# Patient Record
Sex: FEMALE | Race: WHITE | NOT HISPANIC OR LATINO | Employment: OTHER | URBAN - METROPOLITAN AREA
[De-identification: names, ages, dates, MRNs, and addresses within clinical notes are randomized per-mention and may not be internally consistent; named-entity substitution may affect disease eponyms.]

---

## 2017-10-06 ENCOUNTER — TRANSCRIBE ORDERS (OUTPATIENT)
Dept: ADMINISTRATIVE | Facility: HOSPITAL | Age: 62
End: 2017-10-06

## 2017-10-06 DIAGNOSIS — Z12.39 SCREENING BREAST EXAMINATION: Primary | ICD-10-CM

## 2017-11-02 ENCOUNTER — ALLSCRIPTS OFFICE VISIT (OUTPATIENT)
Dept: OTHER | Facility: OTHER | Age: 62
End: 2017-11-02

## 2017-11-02 ENCOUNTER — LAB REQUISITION (OUTPATIENT)
Dept: LAB | Facility: HOSPITAL | Age: 62
End: 2017-11-02
Payer: COMMERCIAL

## 2017-11-02 DIAGNOSIS — Z12.31 ENCOUNTER FOR SCREENING MAMMOGRAM FOR MALIGNANT NEOPLASM OF BREAST: ICD-10-CM

## 2017-11-02 DIAGNOSIS — Z11.51 ENCOUNTER FOR SCREENING FOR HUMAN PAPILLOMAVIRUS (HPV): ICD-10-CM

## 2017-11-02 DIAGNOSIS — Z12.4 ENCOUNTER FOR SCREENING FOR MALIGNANT NEOPLASM OF CERVIX: ICD-10-CM

## 2017-11-02 PROCEDURE — G0145 SCR C/V CYTO,THINLAYER,RESCR: HCPCS | Performed by: OBSTETRICS & GYNECOLOGY

## 2017-11-02 PROCEDURE — 87624 HPV HI-RISK TYP POOLED RSLT: CPT | Performed by: OBSTETRICS & GYNECOLOGY

## 2017-11-03 NOTE — PROGRESS NOTES
Assessment  1  Encounter for annual routine gynecological examination (V72 31) (Z01 419)   2  Encounter for screening mammogram for malignant neoplasm of breast (V76 12)   (Z12 31)    Plan   Cervical cancer screening, Screening for HPV (human papillomavirus)    · (1) THIN PREP PAP WITH IMAGING; Status: In Progress - Specimen/Data Collected;    Done: [id number]   Perform:[facility] In Office Collection; Order Comments:ROUTINE PAP WITH HPV; OYC:[id number]; Ordered; For:Cervical cancer screening, Screening for HPV (human papillomavirus); Ordered By:Neva Birch;  Maturation index required? : No  : PARTIAL HYSTERECTOMY  HPV? : Regardless of Interpretation    * MAMMO SCREENING BILATERAL W CAD; Status:Hold For - Scheduling; Requested CJJ:[id number];   Perform:[facility]; IEV:[id number]; Ordered;    For:Encounter for screening mammogram for malignant neoplasm of breast; Ordered By:Neva Birch;      Discussion/Summary  HPV and Pap Co-testing Done Today Breast cancer screening: the risks and benefits of breast cancer screening were discussed, self breast exam technique was taught, monthly self breast exam was advised, mammogram is current and mammogram has been ordered  Colorectal cancer screening: the risks and benefits of colorectal cancer screening were discussed  Osteoporosis screening: the risks and benefits of osteoporosis screening were discussed  Advice and education were given regarding aerobic exercise and weight bearing exercise  Vaginal dryness-she will call if she needs more estrace, samples given  reviewed and ordered, she has it scheduled  hpv done today  order DEXA next year  The patient has the current Goals: See discussion  The patent has the current Barriers: None  Patient is able to Self-Care  Possible side effects of new medications were reviewed with the patient/guardian today  The treatment plan was reviewed with the patient/guardian   The patient/guardian understands and agrees with the treatment plan      Chief Complaint  1  Visit For: Preventive General Multisystem Exam    History of Present Illness  HPI: Patient here for yearly, she has no gyn complaints  She is using estrace, she has some at home, and does not think she needs a new RX  She is due for mammogram, she has scattered fibroglandular densities  She is due for a Pap  GYN HM, Adult Female [facility]: The patient is being seen for a health maintenance evaluation  The last health maintenance visit was 1 year(s) ago  General Health: The patient's health since the last visit is described as good  Lifestyle:  She does not have any weight concerns  -- She exercises regularly  -- She does not use tobacco    Reproductive health: the patient is postmenopausal    Screening:      Review of Systems    Constitutional: No fever, no chills, feels well, no tiredness, no recent weight gain or loss  ENT: no ear ache, no loss of hearing, no nosebleeds or nasal discharge, no sore throat or hoarseness  Cardiovascular: no complaints of slow or fast heart rate, no chest pain, no palpitations, no leg claudication or lower extremity edema  Respiratory: no complaints of shortness of breath, no wheezing, no dyspnea on exertion, no orthopnea or PND  Breasts: no complaints of breast pain, breast lump or nipple discharge  Gastrointestinal: no complaints of abdominal pain, no constipation, no nausea or diarrhea, no vomiting, no bloody stools  Genitourinary: no complaints of dysuria, no incontinence, no pelvic pain, no dysmenorrhea, no vaginal discharge or abnormal vaginal bleeding  Musculoskeletal: no complaints of arthralgia, no myalgia, no joint swelling or stiffness, no limb pain or swelling  Integumentary: no complaints of skin rash or lesion, no itching or dry skin, no skin wounds  Neurological: no complaints of headache, no confusion, no numbness or tingling, no dizziness or fainting  Active Problems  1   Atrophy of vagina (627  3) (N95 2)   2  Breast self examination education, encounter for (V65 49) (Z71 89)   3  Encounter for annual routine gynecological examination (V72 31) (Z01 419)   4  Encounter for screening mammogram for malignant neoplasm of breast (V76 12)   (Z12 31)   5  Hematochezia (578 1) (K92 1)    Past Medical History   · Breast self examination education, encounter for (V65 49) (Z71 89)   · History of Never Pregnant   · History of Reported A Previous Heart Murmur    The active problems and past medical history were reviewed and updated today  Surgical History   · History of Complete Colonoscopy   · History of Hysteroscopy With Endometrial Ablation   · History of Kidney Surgery    The surgical history was reviewed and updated today  Family History  Mother    · Family history of Diabetes Mellitus (V18 0)   · Family history of Skin Cancer (V16 8)  Father    · Family history of cardiac disorder (V17 49) (Z82 49)  Maternal Grandfather    · Family history of Carcinoma Of The Pancreas    The family history was reviewed and updated today  Social History   · Being A Social Drinker   · Caffeine Use   · Denied: History of Drug Use   · Former smoker (V15 82) (U57 916)   · Marital History - Currently    · Yazidi Affiliation None   · Uses Safety Equipment - Seatbelts  The social history was reviewed and updated today  Current Meds   1  Citracal + D TABS; Therapy: (Recorded:02Nov2017) to Recorded   2  Estrace 0 1 MG/GM Vaginal Cream; 1/4 avila per vagina @ HS twice a week; Therapy: [id number] to (Last Rx:04Oct2016)  Requested for: 04Oct2016 Ordered   3  Ginkgo TABS; Therapy: (Recorded:14Apr2015) to Recorded   4  Glucosamine Chondr 500 Complex Oral Capsule; Therapy: [phone]) to Recorded   5  Multi-Vitamin Daily Oral Tablet; Therapy: (Recorded:14Mar2014) to Recorded    Allergies  1   No Known Drug Allergies    Vitals   Recorded: [id number] 51:87JD   Systolic 961, RUE, Sitting Diastolic 78, RUE, Sitting   Height 5 ft 5 5 in   Weight 151 lb 6 oz   BMI Calculated 24 81   BSA Calculated 1 77   LMP PARTIAL HYSTERECTOMY     Physical Exam    Constitutional   General appearance: No acute distress, well appearing and well nourished  Neck   Thyroid: Normal, no thyromegaly  Pulmonary   Auscultation of lungs: Clear to auscultation  Cardiovascular   Auscultation of heart: Normal rate and rhythm, normal S1 and S2, no murmurs  Genitourinary   External genitalia: Normal and no lesions appreciated  Vagina: Normal, no lesions or dryness appreciated  Urethra: Normal     Urethral meatus: Normal     Bladder: Normal, soft, non-tender and no prolapse or masses appreciated  Cervix: Normal, no palpable masses  Uterus: Normal, non-tender, not enlarged, and no palpable masses  Adnexa/parametria: Normal, non-tender and no fullness or masses appreciated  Anus, perineum, and rectum: Normal sphincter tone, no masses, and no prolapse  Chest   Breasts: Normal and no dimpling or skin changes noted  Abdomen   Abdomen: Normal, non-tender, and no organomegaly noted  Liver and spleen: No hepatomegaly or splenomegaly  Examination for hernias: No hernias appreciated  Psychiatric   Orientation to person, place, and time: Normal     Mood and affect: Normal        Signatures   Electronically signed by :  Jessica Gonzalez DO; Nov 2 2017  9:38PM EST                       (Author)

## 2017-11-07 LAB
HPV RRNA GENITAL QL NAA+PROBE: NORMAL
LAB AP GYN PRIMARY INTERPRETATION: NORMAL
Lab: NORMAL

## 2017-11-09 ENCOUNTER — HOSPITAL ENCOUNTER (OUTPATIENT)
Dept: MAMMOGRAPHY | Facility: MEDICAL CENTER | Age: 62
Discharge: HOME | End: 2017-11-09
Payer: COMMERCIAL

## 2017-11-09 DIAGNOSIS — Z12.31 ENCOUNTER FOR SCREENING MAMMOGRAM FOR MALIGNANT NEOPLASM OF BREAST: ICD-10-CM

## 2017-11-09 PROCEDURE — G0202 SCR MAMMO BI INCL CAD: HCPCS

## 2018-01-13 NOTE — MISCELLANEOUS
Message   Date: 03 Nov 2017 9:27 AM EST, Recorded By: José Miguel Glasgow For: Zeina Escort: Artie Mantilla, Self   Phone: [phone] (Home), [phone] (Work)   Reason: Medical Complaint   pt had received a flu vaccine and was awake in the middle of night with flu-like sx    she was cold and freezing and feels a little bit of nausea    Advised pt to take Tylenol/ Motrin and to hydrate as much as possible  and to eat small meals and have saltines and ginger ale           Active Problems    1  Atrophy of vagina (627 3) (N95 2)   2  Breast self examination education, encounter for (V65 49) (Z71 89)   3  Cervical cancer screening (V76 2) (Z12 4)   4  Encounter for annual routine gynecological examination (V72 31) (Z01 419)   5  Encounter for screening mammogram for malignant neoplasm of breast (V76 12)   (Z12 31)   6  Hematochezia (578 1) (K92 1)   7  Need for influenza vaccination (V04 81) (Z23)   8  Screening for HPV (human papillomavirus) (V73 81) (Z11 51)    Current Meds   1  Citracal + D TABS; Therapy: (Recorded:02Nov2017) to Recorded   2  Estrace 0 1 MG/GM Vaginal Cream; 1/4 avila per vagina @ HS twice a week; Therapy: [id number] to (Last Rx:04Oct2016)  Requested for: 04Oct2016 Ordered   3  Ginkgo TABS; Therapy: (Recorded:14Apr2015) to Recorded   4  Glucosamine Chondr 500 Complex Oral Capsule; Therapy: ([id number]) to Recorded   5  Multi-Vitamin Daily Oral Tablet; Therapy: (Recorded:14Mar2014) to Recorded    Allergies    1  No Known Drug Allergies    Plan  Cervical cancer screening, Screening for HPV (human papillomavirus)    · (1) THIN PREP PAP WITH IMAGING; Status: In Progress - Specimen/Data Collected;    Done: [id number]  Maturation index required? : No  : PARTIAL HYSTERECTOMY  HPV? : Regardless of Interpretation    Signatures   Electronically signed by : Marilu Noriega, ; Nov  3 2017  9:29AM EST                       (Author)

## 2018-01-14 VITALS
SYSTOLIC BLOOD PRESSURE: 126 MMHG | HEIGHT: 66 IN | WEIGHT: 151.38 LBS | BODY MASS INDEX: 24.33 KG/M2 | DIASTOLIC BLOOD PRESSURE: 78 MMHG

## 2018-10-04 DIAGNOSIS — Z12.31 ENCOUNTER FOR SCREENING MAMMOGRAM FOR BREAST CANCER: Primary | ICD-10-CM

## 2018-10-17 ENCOUNTER — TRANSCRIBE ORDERS (OUTPATIENT)
Dept: ADMINISTRATIVE | Facility: HOSPITAL | Age: 63
End: 2018-10-17

## 2018-10-17 DIAGNOSIS — Z13.820 SCREENING FOR OSTEOPOROSIS: Primary | ICD-10-CM

## 2018-11-12 ENCOUNTER — HOSPITAL ENCOUNTER (OUTPATIENT)
Dept: MAMMOGRAPHY | Facility: HOSPITAL | Age: 63
Discharge: HOME | End: 2018-11-12
Attending: OBSTETRICS & GYNECOLOGY
Payer: MEDICARE

## 2018-11-12 VITALS — WEIGHT: 150 LBS | HEIGHT: 66 IN | BODY MASS INDEX: 24.11 KG/M2

## 2018-11-12 DIAGNOSIS — Z12.31 ENCOUNTER FOR SCREENING MAMMOGRAM FOR BREAST CANCER: ICD-10-CM

## 2018-11-12 PROCEDURE — 77067 SCR MAMMO BI INCL CAD: CPT

## 2018-11-27 ENCOUNTER — HOSPITAL ENCOUNTER (OUTPATIENT)
Dept: BONE DENSITY | Facility: MEDICAL CENTER | Age: 63
Discharge: HOME | End: 2018-11-27
Payer: MEDICARE

## 2018-11-27 DIAGNOSIS — Z13.820 SCREENING FOR OSTEOPOROSIS: ICD-10-CM

## 2018-11-27 PROCEDURE — 77080 DXA BONE DENSITY AXIAL: CPT

## 2019-01-04 ENCOUNTER — ANNUAL EXAM (OUTPATIENT)
Dept: OBGYN CLINIC | Facility: CLINIC | Age: 64
End: 2019-01-04
Payer: MEDICARE

## 2019-01-04 VITALS
HEIGHT: 66 IN | BODY MASS INDEX: 24.4 KG/M2 | DIASTOLIC BLOOD PRESSURE: 80 MMHG | WEIGHT: 151.8 LBS | SYSTOLIC BLOOD PRESSURE: 120 MMHG

## 2019-01-04 DIAGNOSIS — Z01.419 ENCOUNTER FOR ANNUAL ROUTINE GYNECOLOGICAL EXAMINATION: ICD-10-CM

## 2019-01-04 DIAGNOSIS — Z12.31 ENCOUNTER FOR SCREENING MAMMOGRAM FOR MALIGNANT NEOPLASM OF BREAST: Primary | ICD-10-CM

## 2019-01-04 PROCEDURE — G0101 CA SCREEN;PELVIC/BREAST EXAM: HCPCS | Performed by: OBSTETRICS & GYNECOLOGY

## 2019-01-04 RX ORDER — ESTRADIOL 0.1 MG/G
CREAM VAGINAL
COMMUNITY
Start: 2016-10-04 | End: 2020-07-13 | Stop reason: SDUPTHER

## 2019-01-04 RX ORDER — ASCORBIC ACID 1000 MG
120 TABLET ORAL
COMMUNITY

## 2019-01-04 NOTE — PROGRESS NOTES
Assessment/Plan:    Pap and HPV are up to date, reviewed guidelines    Mammogram reviewed with her and prescription given for November  Discussed self-breast exams  We discussed Bartholin's cysts  It sounds like that is what she is describing  I told her that if it recurs she can try warm compresses but if it becomes large and or painful, she can come in and we can drain it and possibly insert a Word catheter  She was concerned that this may be hygiene issue but I reassured her  I also stated that more regular use of the estrogen cream May decreased recurrences as her tissue was somewhat atrophic  Vaginal atrophy-Estrace cream not renewed because she still has some at home, she will start using it once a week  Colon cancer screening discussed    Discussed regular exercise  No problem-specific Assessment & Plan notes found for this encounter  Diagnoses and all orders for this visit:    Encounter for screening mammogram for malignant neoplasm of breast  -     Mammo screening bilateral w cad; Future    Encounter for annual routine gynecological examination          Subjective:      Patient ID: Cruz River is a 70 y o  female  Patient here for yearly  She is doing well  She is using vaginal estrogen cream but is not consistent with its use  Since her 25s, she has had issues with a left Bartholin's cyst   She has not mentioned it because it often comes and goes over several days and always seems to spontaneously resolve  It last happened about 3 weeks ago  It drains on its own and then she has a little sore  She had a normal Pap in 2017  In November she had a normal DEXA and also a normal 3D mammogram that showed fatty replaced breast tissue          The following portions of the patient's history were reviewed and updated as appropriate: allergies, current medications, past family history, past medical history, past social history, past surgical history and problem list     Review of Systems   All other systems reviewed and are negative  Objective:      /80 (BP Location: Left arm, Patient Position: Sitting, Cuff Size: Large)   Ht 5' 6" (1 676 m)   Wt 68 9 kg (151 lb 12 8 oz)   BMI 24 50 kg/m²          Physical Exam   Constitutional: She appears well-developed  Neck: No tracheal deviation present  No thyromegaly present  Cardiovascular: Normal rate and regular rhythm  Pulmonary/Chest: Effort normal and breath sounds normal  Right breast exhibits no inverted nipple, no mass, no nipple discharge, no skin change and no tenderness  Left breast exhibits no inverted nipple, no mass, no nipple discharge, no skin change and no tenderness  Breasts are symmetrical    Examined seated and supine   Abdominal: Soft  She exhibits no distension and no mass  There is no tenderness  Genitourinary: Rectum normal, vagina normal and uterus normal  No labial fusion  There is no rash, tenderness, lesion or injury on the right labia  There is no rash, tenderness, lesion or injury on the left labia  Cervix exhibits no motion tenderness, no discharge and no friability  Right adnexum displays no mass, no tenderness and no fullness  Left adnexum displays no mass, no tenderness and no fullness  Genitourinary Comments: No sign of Bartholin cyst on either side and no sign of labial cysts  Vitals reviewed

## 2019-10-21 ENCOUNTER — OFFICE VISIT (OUTPATIENT)
Dept: OBGYN CLINIC | Facility: CLINIC | Age: 64
End: 2019-10-21
Payer: MEDICARE

## 2019-10-21 VITALS
SYSTOLIC BLOOD PRESSURE: 120 MMHG | HEIGHT: 66 IN | BODY MASS INDEX: 23.95 KG/M2 | DIASTOLIC BLOOD PRESSURE: 70 MMHG | WEIGHT: 149 LBS

## 2019-10-21 DIAGNOSIS — N90.7 VULVAR CYST: Primary | ICD-10-CM

## 2019-10-21 PROCEDURE — 99213 OFFICE O/P EST LOW 20 MIN: CPT | Performed by: OBSTETRICS & GYNECOLOGY

## 2019-10-21 RX ORDER — ESTRADIOL 0.1 MG/G
CREAM VAGINAL
COMMUNITY
Start: 2016-10-04 | End: 2020-07-21 | Stop reason: SDUPTHER

## 2019-10-21 NOTE — PROGRESS NOTES
Assessment/Plan:   Resolved vulvar lesions  I instructed dull to simply apply Neosporin cream to these areas for 1 or 2 days more to completely resolve these areas  I also informed her that in the future, when these occur, she should be seen since at this point in time they are 98% resolved and it is very difficult to establish what may have been the cause  There are no diagnoses linked to this encounter  Subjective:     Patient ID: Marck Mcgarry is a 75 y o  female  [facility] is here today with concerns of an area of the left vulva that initially started a week ago with pain and swelling  Over the course of the week, the patient applied warm compresses and Neosporin to the point where she presents today to ensure that everything is okay  She states that this is been recurring "all her life"  She apparently has had Bartholin's cysts in the past and wanted to know what was going on  Review of Systems    Only significant for discomfort in left vulvar region  Objective:     Physical Exam    The vulvar tissue overall is within normal limits with no obvious lesions or palpable masses  The left vulva around the 4 o'clock region, has 2 minute areas of very superficial skin Man S and erythema measuring no more than 1 mm each  There is no surrounding erythema or underlying swelling  Palpation of the left Bartholin's gland fails to reveal any significant findings

## 2019-11-26 ENCOUNTER — TELEPHONE (OUTPATIENT)
Dept: OBGYN CLINIC | Facility: CLINIC | Age: 64
End: 2019-11-26

## 2019-12-18 ENCOUNTER — HOSPITAL ENCOUNTER (OUTPATIENT)
Dept: MAMMOGRAPHY | Facility: HOSPITAL | Age: 64
Discharge: HOME | End: 2019-12-18
Attending: OBSTETRICS & GYNECOLOGY
Payer: MEDICARE

## 2019-12-18 VITALS — BODY MASS INDEX: 23.95 KG/M2 | HEIGHT: 66 IN | WEIGHT: 149 LBS

## 2019-12-18 DIAGNOSIS — Z12.31 ENCOUNTER FOR SCREENING MAMMOGRAM FOR MALIGNANT NEOPLASM OF BREAST: ICD-10-CM

## 2019-12-18 PROCEDURE — 77067 SCR MAMMO BI INCL CAD: CPT

## 2020-07-13 DIAGNOSIS — N95.2 VAGINAL ATROPHY: Primary | ICD-10-CM

## 2020-07-13 RX ORDER — ESTRADIOL 0.1 MG/G
1 CREAM VAGINAL 2 TIMES WEEKLY
Qty: 42.5 G | Refills: 0 | Status: SHIPPED | OUTPATIENT
Start: 2020-07-13 | End: 2021-01-05 | Stop reason: SDUPTHER

## 2020-07-21 ENCOUNTER — ANNUAL EXAM (OUTPATIENT)
Dept: OBGYN CLINIC | Facility: CLINIC | Age: 65
End: 2020-07-21
Payer: MEDICARE

## 2020-07-21 VITALS
WEIGHT: 146.6 LBS | SYSTOLIC BLOOD PRESSURE: 122 MMHG | BODY MASS INDEX: 23.66 KG/M2 | TEMPERATURE: 97 F | DIASTOLIC BLOOD PRESSURE: 74 MMHG

## 2020-07-21 DIAGNOSIS — Z12.31 ENCOUNTER FOR SCREENING MAMMOGRAM FOR BREAST CANCER: ICD-10-CM

## 2020-07-21 DIAGNOSIS — Z01.419 ENCOUNTER FOR ANNUAL ROUTINE GYNECOLOGICAL EXAMINATION: Primary | ICD-10-CM

## 2020-07-21 DIAGNOSIS — N95.2 VAGINAL ATROPHY: ICD-10-CM

## 2020-07-21 PROCEDURE — G0101 CA SCREEN;PELVIC/BREAST EXAM: HCPCS | Performed by: OBSTETRICS & GYNECOLOGY

## 2020-07-21 RX ORDER — ESTRADIOL 0.1 MG/G
CREAM VAGINAL
Qty: 42.5 G | Refills: 1 | Status: SHIPPED | OUTPATIENT
Start: 2020-07-21 | End: 2021-07-29 | Stop reason: SDUPTHER

## 2020-07-21 NOTE — PROGRESS NOTES
Assessment/Plan:    She does not require a Pap was    mammogram reviewed with her including breast density  RX given for December   Discussed self breast exams    colon cancer screening - colonoscopy up to date    discussed preventive care, regular exercise and a healthy diet    Vaginal atrophy-paper prescription given for estradiol plain  She will check her formulary to see if there is better coverage so her another she is vaginal estrogen product  No problem-specific Assessment & Plan notes found for this encounter  Diagnoses and all orders for this visit:    Encounter for annual routine gynecological examination    Encounter for screening mammogram for breast cancer  -     Mammo screening bilateral w 3d & cad; Future    Vaginal atrophy  -     estradiol (ESTRACE VAGINAL) 0 1 mg/g vaginal cream; Insert 1 gm per vagina twice a week at bedtime          Subjective:      Patient ID: Isidro Wright is a 77 y o  female  Patient here for renewal of estradiol vaginal cream   She has no gyn complaints  She is using vaginal estrogen cream, estradiol, but the most recent prescription was very expensive and used to be covered  History of vulvar cysts, possibly Bartholins but this has been asymptomatic recently  Normal Pap and negative HPV in November 2017  Normal mammogram in December showed almost completely fatty tissue and an average risk  Normal DEXA in November 2018  The following portions of the patient's history were reviewed and updated as appropriate: allergies, current medications, past family history, past medical history, past social history, past surgical history and problem list     Review of Systems   Constitutional: Negative  Gastrointestinal: Negative  Genitourinary: Negative            Objective:      /74 (BP Location: Left arm, Patient Position: Sitting, Cuff Size: Standard)   Temp (!) 97 °F (36 1 °C)   Wt 66 5 kg (146 lb 9 6 oz)   BMI 23 66 kg/m²          Physical Exam   Constitutional: She appears well-developed  Neck: No tracheal deviation present  No thyromegaly present  Cardiovascular: Normal rate and regular rhythm  Pulmonary/Chest: Effort normal and breath sounds normal  Right breast exhibits no inverted nipple, no mass, no nipple discharge, no skin change and no tenderness  Left breast exhibits no inverted nipple, no mass, no nipple discharge, no skin change and no tenderness  Breasts are symmetrical    Examined seated and supine   Abdominal: Soft  She exhibits no distension and no mass  There is no tenderness  Genitourinary: Rectum normal, vagina normal and uterus normal  No labial fusion  There is no rash, tenderness, lesion or injury on the right labia  There is no rash, tenderness, lesion or injury on the left labia  Cervix exhibits no motion tenderness, no discharge and no friability  Right adnexum displays no mass, no tenderness and no fullness  Left adnexum displays no mass, no tenderness and no fullness  Vitals reviewed

## 2020-10-05 ENCOUNTER — TELEPHONE (OUTPATIENT)
Dept: OBGYN CLINIC | Facility: CLINIC | Age: 65
End: 2020-10-05

## 2020-10-06 DIAGNOSIS — N90.89 VULVAR IRRITATION: Primary | ICD-10-CM

## 2020-10-06 RX ORDER — CLOTRIMAZOLE AND BETAMETHASONE DIPROPIONATE 10; .64 MG/G; MG/G
CREAM TOPICAL 2 TIMES DAILY
Qty: 30 G | Refills: 0 | Status: SHIPPED | OUTPATIENT
Start: 2020-10-06

## 2021-01-05 DIAGNOSIS — N95.2 VAGINAL ATROPHY: ICD-10-CM

## 2021-01-05 RX ORDER — ESTRADIOL 0.1 MG/G
1 CREAM VAGINAL 2 TIMES WEEKLY
Qty: 42.5 G | Refills: 0 | Status: SHIPPED | OUTPATIENT
Start: 2021-01-07

## 2021-03-10 ENCOUNTER — HOSPITAL ENCOUNTER (OUTPATIENT)
Dept: CT IMAGING | Facility: HOSPITAL | Age: 66
Discharge: HOME | End: 2021-03-10
Payer: MEDICARE

## 2021-03-10 DIAGNOSIS — J38.7 EPIGLOTTIC LESION: ICD-10-CM

## 2021-03-10 DIAGNOSIS — Z23 ENCOUNTER FOR IMMUNIZATION: ICD-10-CM

## 2021-03-10 PROCEDURE — G1004 CDSM NDSC: HCPCS

## 2021-03-10 PROCEDURE — 70491 CT SOFT TISSUE NECK W/DYE: CPT

## 2021-03-10 RX ADMIN — IOHEXOL 85 ML: 350 INJECTION, SOLUTION INTRAVENOUS at 15:21

## 2021-06-08 ENCOUNTER — OFFICE VISIT (OUTPATIENT)
Dept: OBGYN CLINIC | Facility: CLINIC | Age: 66
End: 2021-06-08
Payer: MEDICARE

## 2021-06-08 ENCOUNTER — TELEPHONE (OUTPATIENT)
Dept: OBGYN CLINIC | Facility: CLINIC | Age: 66
End: 2021-06-08

## 2021-06-08 VITALS
DIASTOLIC BLOOD PRESSURE: 64 MMHG | WEIGHT: 148.4 LBS | HEIGHT: 66 IN | BODY MASS INDEX: 23.85 KG/M2 | SYSTOLIC BLOOD PRESSURE: 102 MMHG

## 2021-06-08 DIAGNOSIS — N76.6 VULVAR ULCER: Primary | ICD-10-CM

## 2021-06-08 PROCEDURE — 99213 OFFICE O/P EST LOW 20 MIN: CPT | Performed by: OBSTETRICS & GYNECOLOGY

## 2021-06-08 PROCEDURE — 87255 GENET VIRUS ISOLATE HSV: CPT | Performed by: OBSTETRICS & GYNECOLOGY

## 2021-06-08 RX ORDER — VALACYCLOVIR HYDROCHLORIDE 1 G/1
1000 TABLET, FILM COATED ORAL 2 TIMES DAILY
Qty: 14 TABLET | Refills: 0 | Status: SHIPPED | OUTPATIENT
Start: 2021-06-08 | End: 2021-06-22 | Stop reason: SDUPTHER

## 2021-06-08 RX ORDER — LIDOCAINE HYDROCHLORIDE 20 MG/ML
JELLY TOPICAL
Qty: 5 ML | Refills: 0 | Status: SHIPPED | OUTPATIENT
Start: 2021-06-08

## 2021-06-08 NOTE — PROGRESS NOTES
Assessment/Plan:     Labial lesions/ulcer-I did a culture for HSV however this may not be helpful as it appears to be possibly healing  She does not have risk factors for HSV  We discussed doing antibodies but she prefers to hold off on that for now  Will treat with Valtrex and lidocaine gel as it is uncomfortable  We will await the culture and see how she feels after taking the Valtrex  It is also possible that it is an abrasion or lesion from cycling or another activity  She will call if this does not resolve or if it worsens  There are no diagnoses linked to this encounter  Subjective:     Patient ID: Matt Tyson is a 74 y o  female  Patient here for evaluation of a left vulvar lesion  She has had it for about a week  It is tender  She has had no itching or bleeding  She tried Neosporin but this did not help and she also used some Lotrisone with no relief  She has no new partner and has no history of HSV  Review of Systems   Constitutional: Negative  Genitourinary:        Vulvar lesion, pain         Objective:     Physical Exam  Genitourinary:     Labia:         Left: Tenderness and lesion present  Comments: What appears to be a healing lesion at 3:00 a m  In the left labia, there is erythema above and below this area    No bleeding, no exudate or drainage, viral culture done

## 2021-06-08 NOTE — TELEPHONE ENCOUNTER
Patient called c/o labial cut or sore x 1 week, painful, no bleeding or discharge  Has been using Neosporin with no improvement  Per Dr Clover Gleason, gave patient appointment today in [facility] @ 2:00

## 2021-06-10 LAB — HSV SPEC CULT: ABNORMAL

## 2021-06-22 DIAGNOSIS — N76.6 VULVAR ULCER: ICD-10-CM

## 2021-06-22 RX ORDER — VALACYCLOVIR HYDROCHLORIDE 1 G/1
1000 TABLET, FILM COATED ORAL 2 TIMES DAILY
Qty: 14 TABLET | Refills: 0 | Status: SHIPPED | OUTPATIENT
Start: 2021-06-22 | End: 2021-07-29 | Stop reason: ALTCHOICE

## 2021-06-22 NOTE — TELEPHONE ENCOUNTER
Patient called requesting a refill of Valacyclovir  Her Yearly is scheduled for 7/29/21  Patient did not answer her phone  Same escript as last time sent to Dr Ena Nelson

## 2021-07-29 ENCOUNTER — ANNUAL EXAM (OUTPATIENT)
Dept: OBGYN CLINIC | Facility: CLINIC | Age: 66
End: 2021-07-29
Payer: MEDICARE

## 2021-07-29 VITALS
WEIGHT: 147.6 LBS | BODY MASS INDEX: 23.72 KG/M2 | SYSTOLIC BLOOD PRESSURE: 112 MMHG | HEIGHT: 66 IN | DIASTOLIC BLOOD PRESSURE: 74 MMHG

## 2021-07-29 DIAGNOSIS — Z01.419 ENCOUNTER FOR ANNUAL ROUTINE GYNECOLOGICAL EXAMINATION: Primary | ICD-10-CM

## 2021-07-29 DIAGNOSIS — Z12.31 ENCOUNTER FOR SCREENING MAMMOGRAM FOR BREAST CANCER: ICD-10-CM

## 2021-07-29 DIAGNOSIS — N95.2 VAGINAL ATROPHY: ICD-10-CM

## 2021-07-29 DIAGNOSIS — A60.9 HSV (HERPES SIMPLEX VIRUS) ANOGENITAL INFECTION: ICD-10-CM

## 2021-07-29 PROCEDURE — G0101 CA SCREEN;PELVIC/BREAST EXAM: HCPCS | Performed by: OBSTETRICS & GYNECOLOGY

## 2021-07-29 RX ORDER — VALACYCLOVIR HYDROCHLORIDE 500 MG/1
500 TABLET, FILM COATED ORAL 2 TIMES DAILY
Qty: 18 TABLET | Refills: 2 | Status: SHIPPED | OUTPATIENT
Start: 2021-07-29 | End: 2021-09-13

## 2021-07-29 RX ORDER — ESTRADIOL 0.1 MG/G
CREAM VAGINAL
Qty: 42.5 G | Refills: 1 | Status: SHIPPED | OUTPATIENT
Start: 2021-07-29

## 2021-07-29 NOTE — PROGRESS NOTES
Assessment/Plan:    pap is up to date    mammogram reviewed with her including breast density  RX given, I urged her to schedule this     Discussed self breast exams    colon cancer screening -colonoscopy is up to date    Vaginal atrophy- paper prescription given for vaginal estrogen     HSV- I gave her a prescription for Valtrex 500 mg and explained that if she has a recurrence, to take it b i d  for 3 days  discussed preventive care, regular exercise and a healthy diet      No problem-specific Assessment & Plan notes found for this encounter  Diagnoses and all orders for this visit:    Encounter for annual routine gynecological examination    Encounter for screening mammogram for breast cancer  -     Mammo screening bilateral w 3d & cad; Future    HSV (herpes simplex virus) anogenital infection  -     valACYclovir (VALTREX) 500 mg tablet; Take 1 tablet (500 mg total) by mouth 2 (two) times a day for 3 days    Vaginal atrophy  -     estradiol (ESTRACE VAGINAL) 0 1 mg/g vaginal cream; Insert 1 gm per vagina twice a week at bedtime          Subjective:      Patient ID: Anna Bryant is a 74 y o  female  Patient here for yearly  In June, she came in for a vulvar lesion and was diagnosed with HSV  She has not had another outbreak, she thought she was starting 1 right after the 1st 1 but it seemed to have resolved  She has no other complaints  She does use vaginal estrogen and will need a new prescription  Normal Pap and negative HPV in 2017  Normal mammogram in December 2019 with fatty tissue and average risk  The following portions of the patient's history were reviewed and updated as appropriate: allergies, current medications, past family history, past medical history, past social history, past surgical history and problem list     Review of Systems   Constitutional: Negative  Gastrointestinal: Negative  Genitourinary: Negative            Objective:      /74 (BP Location: Left arm, Patient Position: Sitting, Cuff Size: Standard)   Ht 5' 6" (1 676 m)   Wt 67 kg (147 lb 9 6 oz)   BMI 23 82 kg/m²          Physical Exam  Vitals reviewed  Constitutional:       Appearance: She is well-developed  Neck:      Thyroid: No thyromegaly  Trachea: No tracheal deviation  Cardiovascular:      Rate and Rhythm: Normal rate and regular rhythm  Pulmonary:      Effort: Pulmonary effort is normal       Breath sounds: Normal breath sounds  Chest:      Breasts: Breasts are symmetrical          Right: No inverted nipple, mass, nipple discharge, skin change or tenderness  Left: No inverted nipple, mass, nipple discharge, skin change or tenderness  Abdominal:      General: There is no distension  Palpations: Abdomen is soft  There is no mass  Tenderness: There is no abdominal tenderness  Genitourinary:     Labia:         Right: No rash, tenderness, lesion or injury  Left: No rash, tenderness, lesion or injury  Vagina: Normal       Cervix: No cervical motion tenderness, discharge or friability  Adnexa:         Right: No mass, tenderness or fullness  Left: No mass, tenderness or fullness          Rectum: Normal

## 2022-11-17 ENCOUNTER — HOSPITAL ENCOUNTER (OUTPATIENT)
Dept: MAMMOGRAPHY | Facility: MEDICAL CENTER | Age: 67
Discharge: HOME | End: 2022-11-17

## 2022-11-17 VITALS — BODY MASS INDEX: 23.95 KG/M2 | WEIGHT: 149.03 LBS | HEIGHT: 66 IN

## 2022-11-17 DIAGNOSIS — Z12.31 ENCOUNTER FOR SCREENING MAMMOGRAM FOR BREAST CANCER: ICD-10-CM

## 2023-03-16 DIAGNOSIS — N95.2 VAGINAL ATROPHY: Primary | ICD-10-CM

## 2023-03-17 RX ORDER — ESTRADIOL 0.1 MG/G
CREAM VAGINAL
Qty: 42.5 G | Refills: 1 | Status: SHIPPED | OUTPATIENT
Start: 2023-03-17

## 2023-06-20 DIAGNOSIS — A60.9 HSV (HERPES SIMPLEX VIRUS) ANOGENITAL INFECTION: ICD-10-CM

## 2023-06-20 RX ORDER — VALACYCLOVIR HYDROCHLORIDE 500 MG/1
TABLET, FILM COATED ORAL
Qty: 18 TABLET | Refills: 2 | Status: SHIPPED | OUTPATIENT
Start: 2023-06-20 | End: 2023-06-23

## 2024-06-21 ENCOUNTER — OFFICE VISIT (OUTPATIENT)
Dept: GYNECOLOGY | Facility: CLINIC | Age: 69
End: 2024-06-21
Payer: MEDICARE

## 2024-06-21 ENCOUNTER — NURSE TRIAGE (OUTPATIENT)
Age: 69
End: 2024-06-21

## 2024-06-21 VITALS — SYSTOLIC BLOOD PRESSURE: 136 MMHG | BODY MASS INDEX: 24.37 KG/M2 | DIASTOLIC BLOOD PRESSURE: 72 MMHG | WEIGHT: 151 LBS

## 2024-06-21 DIAGNOSIS — N90.89 VULVAR IRRITATION: ICD-10-CM

## 2024-06-21 PROCEDURE — 99213 OFFICE O/P EST LOW 20 MIN: CPT | Performed by: OBSTETRICS & GYNECOLOGY

## 2024-06-21 RX ORDER — AZELASTINE HYDROCHLORIDE 137 UG/1
SPRAY, METERED NASAL
COMMUNITY
Start: 2024-05-30

## 2024-06-21 RX ORDER — LORATADINE 10 MG/1
10 TABLET ORAL DAILY
COMMUNITY
Start: 2024-05-10

## 2024-06-21 RX ORDER — ATORVASTATIN CALCIUM 20 MG/1
20 TABLET, FILM COATED ORAL DAILY
COMMUNITY
Start: 2024-04-18

## 2024-06-21 RX ORDER — CLOTRIMAZOLE AND BETAMETHASONE DIPROPIONATE 10; .64 MG/G; MG/G
CREAM TOPICAL 2 TIMES DAILY
Qty: 30 G | Refills: 0 | Status: SHIPPED | OUTPATIENT
Start: 2024-06-21 | End: 2024-06-28

## 2024-06-21 NOTE — PROGRESS NOTES
Assessment/Plan:     Acute vulvitis - most likely this is vulvar candidiasis.  Will treat with Lotrisone.  If no relief, will call.  If persistent, may consider vulvar bx.     There are no diagnoses linked to this encounter.      Subjective:     Patient ID: Cora Escalona is a 69 y.o. female.    Pt here for evaluation of vulvar itching.  This started on Wednesday.  It is severe itching, external on anterior vulva.  Denies bleeding or discharge, no fever, chills or dysuria.  She does use vaginal estrogen once a week but did not use it last week.    This is a new problem.  H/o HSV but this does not seem to be an outbreak.        Review of Systems   Constitutional: Negative.    Genitourinary:  Negative for dysuria, urgency, vaginal bleeding and vaginal discharge.        Vulvar itching         Objective:     Physical Exam  Genitourinary:     Vagina: Normal.      Cervix: Normal.      Uterus: Normal.       Adnexa: Right adnexa normal and left adnexa normal.      Comments: Mild erythema anteriorly. No lesions  No vaginal discharge

## 2024-06-21 NOTE — TELEPHONE ENCOUNTER
"Pt calling requesting to be seen by Dr Birch due to severe 10/10 itching in the vaginal area the last few days. Denies rash, vaginal discharge, other symptoms. Tried to apply Benadryl topical which helped a little, but severe itching returned once medication wore off. States does not feel like a herpes outbreak. RN able to schedule visit this morning to be seen by Dr Birch. Pt hasn't been seen for office visit in about 2.5 years. RN advised that she should schedule an annual to catch up on care while in office today. Pt agreeable to plan. No further questions.    Reason for Disposition  • MODERATE-SEVERE itching (i.e., interferes with school, work, or sleep)    Answer Assessment - Initial Assessment Questions  1. SYMPTOM: \"What's the main symptom you're concerned about?\" (e.g., pain, itching, dryness)      Itching  2. LOCATION: \"Where is the  s/s located?\" (e.g., inside/outside, left/right)      External  3. ONSET: \"When did the  s/s  start?\"      Yesterday  4. PAIN: \"Is there any pain?\" If Yes, ask: \"How bad is it?\" (Scale: 1-10; mild, moderate, severe)      NA  5. ITCHING: \"Is there any itching?\" If Yes, ask: \"How bad is it?\" (Scale: 1-10; mild, moderate, severe)      10/10  6. CAUSE: \"What do you think is causing the discharge?\" \"Have you had the same problem before? What happened then?\"      NA  7. OTHER SYMPTOMS: \"Do you have any other symptoms?\" (e.g., fever, itching, vaginal bleeding, pain with urination, injury to genital area, vaginal foreign body)      Itching  8. PREGNANCY: \"Is there any chance you are pregnant?\" \"When was your last menstrual period?\"      NA    Protocols used: Vaginal Symptoms-ADULT-[place]    "

## 2024-07-17 ENCOUNTER — HOSPITAL ENCOUNTER (OUTPATIENT)
Dept: MAMMOGRAPHY | Facility: MEDICAL CENTER | Age: 69
Discharge: HOME | End: 2024-07-17
Payer: MEDICARE

## 2024-07-17 VITALS — BODY MASS INDEX: 25.07 KG/M2 | WEIGHT: 156 LBS | HEIGHT: 66 IN

## 2024-07-17 DIAGNOSIS — Z12.31 ENCOUNTER FOR SCREENING MAMMOGRAM FOR BREAST CANCER: ICD-10-CM

## 2024-07-17 PROCEDURE — 77067 SCR MAMMO BI INCL CAD: CPT

## 2024-07-17 PROCEDURE — 77063 BREAST TOMOSYNTHESIS BI: CPT

## 2024-11-10 DIAGNOSIS — A60.9 HSV (HERPES SIMPLEX VIRUS) ANOGENITAL INFECTION: ICD-10-CM

## 2024-11-11 RX ORDER — VALACYCLOVIR HYDROCHLORIDE 500 MG/1
TABLET, FILM COATED ORAL
Qty: 18 TABLET | Refills: 2 | Status: SHIPPED | OUTPATIENT
Start: 2024-11-11 | End: 2024-11-14

## 2024-11-11 NOTE — TELEPHONE ENCOUNTER
Patient called requesting refill for valACYclovir (VALTREX). Patient made aware medication was refilled today for 18 tablets with 2 refills to [facility] pharmacy. Patient instructed to contact the pharmacy to obtain refills of medication. Patient verbalized understanding.

## 2024-12-14 DIAGNOSIS — N95.2 VAGINAL ATROPHY: ICD-10-CM

## 2024-12-16 RX ORDER — ESTRADIOL 0.1 MG/G
CREAM VAGINAL
Qty: 42.5 G | Refills: 1 | Status: SHIPPED | OUTPATIENT
Start: 2024-12-16

## 2025-03-06 ENCOUNTER — ANNUAL EXAM (OUTPATIENT)
Dept: GYNECOLOGY | Facility: CLINIC | Age: 70
End: 2025-03-06
Payer: MEDICARE

## 2025-03-06 VITALS
HEIGHT: 66 IN | SYSTOLIC BLOOD PRESSURE: 132 MMHG | DIASTOLIC BLOOD PRESSURE: 76 MMHG | WEIGHT: 154 LBS | BODY MASS INDEX: 24.75 KG/M2

## 2025-03-06 DIAGNOSIS — Z12.31 ENCOUNTER FOR SCREENING MAMMOGRAM FOR BREAST CANCER: ICD-10-CM

## 2025-03-06 DIAGNOSIS — A60.9 HSV (HERPES SIMPLEX VIRUS) ANOGENITAL INFECTION: ICD-10-CM

## 2025-03-06 DIAGNOSIS — M85.852 OSTEOPENIA OF NECK OF LEFT FEMUR: ICD-10-CM

## 2025-03-06 DIAGNOSIS — Z01.419 ENCOUNTER FOR ANNUAL ROUTINE GYNECOLOGICAL EXAMINATION: Primary | ICD-10-CM

## 2025-03-06 PROBLEM — I37.1 PULMONIC VALVE REGURGITATION: Status: ACTIVE | Noted: 2023-04-17

## 2025-03-06 PROBLEM — M85.80 OSTEOPENIA: Status: ACTIVE | Noted: 2023-04-07

## 2025-03-06 PROCEDURE — G0101 CA SCREEN;PELVIC/BREAST EXAM: HCPCS | Performed by: OBSTETRICS & GYNECOLOGY

## 2025-03-06 RX ORDER — VALACYCLOVIR HYDROCHLORIDE 500 MG/1
500 TABLET, FILM COATED ORAL 2 TIMES DAILY
Qty: 18 TABLET | Refills: 2 | Status: SHIPPED | OUTPATIENT
Start: 2025-03-06 | End: 2025-03-09

## 2025-03-06 NOTE — PROGRESS NOTES
Name: Cora Escalona      : 1955      MRN: [id number]  Encounter Provider: Neva Birch DO  Encounter Date: 3/6/2025   Encounter department: [facility]  :  Assessment & Plan  Encounter for annual routine gynecological examination         Encounter for screening mammogram for breast cancer    Orders:    Mammo screening bilateral w 3d and cad; Future    Osteopenia of neck of left femur    Orders:    DXA bone density spine hip and pelvis; Future    HSV (herpes simplex virus) anogenital infection    Orders:    valACYclovir (VALTREX) 500 mg tablet; Take 1 tablet (500 mg total) by mouth 2 (two) times a day for 3 days    pap is up to date    mammogram reviewed with her including breast density.  RX given for July     Discussed self breast exams    colon cancer screening - due now, she is planning to call to make an appointment    Mild osteopenia - DEXA ordered, she is due next month. Discussed adequate calcium and vit D.    H/o HSV - paper rx given to use as needed    Vaginal atrophy -she does not need vaginal estrogen yet as she just had it refilled.  She will call when she needs a refill    discussed preventive care, regular exercise and a healthy diet      History of Present Illness   HPI  Cora Escalona is a 69 y.o. female who presents for yearly.  She has been using vaginal estrogen weekly    Normal 3D mammogram in July with scattered fibroglandular densities and average risk.  DEXA in 2023 with borderline osteopenia in the femoral neck.  She did not meet treatment criteria.  History of HSV, she does not get frequent outbreaks        Review of Systems   Constitutional: Negative.    Gastrointestinal: Negative.    Genitourinary: Negative.           Objective   There were no vitals taken for this visit.     Physical Exam  Vitals and nursing note reviewed. Exam conducted with a chaperone present.   Constitutional:       Appearance: She is well-developed.   HENT:      Head: Normocephalic  and atraumatic.   Neck:      Thyroid: No thyromegaly.   Cardiovascular:      Rate and Rhythm: Normal rate and regular rhythm.   Pulmonary:      Effort: Pulmonary effort is normal.      Breath sounds: Normal breath sounds.   Chest:   Breasts:     Right: Normal.      Left: Normal.      Comments: Examined seated and supine  Abdominal:      Palpations: Abdomen is soft.   Genitourinary:     General: Normal vulva.      Vagina: Normal.      Cervix: Normal.      Uterus: Normal.       Adnexa: Right adnexa normal and left adnexa normal.      Rectum: Normal.   Musculoskeletal:      Cervical back: Neck supple.   Skin:     General: Skin is warm and dry.   Neurological:      Mental Status: She is alert.   Psychiatric:         Mood and Affect: Mood normal.

## 2025-04-30 ENCOUNTER — HOSPITAL ENCOUNTER (OUTPATIENT)
Dept: BONE DENSITY | Facility: MEDICAL CENTER | Age: 70
Discharge: HOME | End: 2025-04-30
Payer: MEDICARE

## 2025-04-30 VITALS — HEIGHT: 66 IN | WEIGHT: 154 LBS | BODY MASS INDEX: 24.75 KG/M2

## 2025-04-30 DIAGNOSIS — M85.852 OSTEOPENIA OF NECK OF LEFT FEMUR: ICD-10-CM

## 2025-04-30 PROCEDURE — 77080 DXA BONE DENSITY AXIAL: CPT

## 2025-05-12 ENCOUNTER — RESULTS FOLLOW-UP (OUTPATIENT)
Dept: GYNECOLOGY | Facility: CLINIC | Age: 70
End: 2025-05-12

## 2025-05-27 ENCOUNTER — TELEPHONE (OUTPATIENT)
Age: 70
End: 2025-05-27

## 2025-05-27 NOTE — TELEPHONE ENCOUNTER
Pt called in reviewed dexus scan results. Pt request to have results fax[facility] over to [phone]

## 2025-07-24 ENCOUNTER — HOSPITAL ENCOUNTER (OUTPATIENT)
Dept: MAMMOGRAPHY | Facility: MEDICAL CENTER | Age: 70
Discharge: HOME | End: 2025-07-24
Payer: MEDICARE

## 2025-07-24 VITALS — WEIGHT: 154 LBS | BODY MASS INDEX: 24.75 KG/M2 | HEIGHT: 66 IN

## 2025-07-24 DIAGNOSIS — Z12.31 ENCOUNTER FOR SCREENING MAMMOGRAM FOR BREAST CANCER: ICD-10-CM

## 2025-07-24 PROCEDURE — 77063 BREAST TOMOSYNTHESIS BI: CPT

## 2025-07-24 PROCEDURE — 77067 SCR MAMMO BI INCL CAD: CPT
